# Patient Record
Sex: FEMALE | Race: BLACK OR AFRICAN AMERICAN | NOT HISPANIC OR LATINO | URBAN - METROPOLITAN AREA
[De-identification: names, ages, dates, MRNs, and addresses within clinical notes are randomized per-mention and may not be internally consistent; named-entity substitution may affect disease eponyms.]

---

## 2020-07-25 ENCOUNTER — TELEPHONE ENCOUNTER (OUTPATIENT)
Dept: URBAN - METROPOLITAN AREA CLINIC 13 | Facility: CLINIC | Age: 50
End: 2020-07-25

## 2020-07-25 RX ORDER — ETONOGESTREL AND ETHINYL ESTRADIOL .12; .015 MG/D; MG/D
USE AS DIRECTED INSERT, EXTENDED RELEASE VAGINAL
Refills: 0 | OUTPATIENT
Start: 2015-01-27 | End: 2016-05-03

## 2020-07-25 RX ORDER — OMEPRAZOLE 40 MG/1
TAKE ONE CAPSULE BY MOUTH ONCE DAILY CAPSULE, DELAYED RELEASE ORAL
Qty: 30 | Refills: 3 | OUTPATIENT
Start: 2014-02-21 | End: 2016-05-03

## 2020-07-25 RX ORDER — NORETHINDRONE ACETATE/ETHINYL ESTRADIOL 1.5-0.03MG
TAKE 1 TABLET DAILY KIT ORAL
Qty: 84 | Refills: 0 | OUTPATIENT
Start: 2014-02-14 | End: 2015-02-03

## 2020-07-26 ENCOUNTER — TELEPHONE ENCOUNTER (OUTPATIENT)
Dept: URBAN - METROPOLITAN AREA CLINIC 13 | Facility: CLINIC | Age: 50
End: 2020-07-26

## 2020-07-26 RX ORDER — HYOSCYAMINE SULFATE 0.12 MG/1
PLACE 1 TABLET UNDER THE TONGUE EVERY 4 TO 6 HOURS AS NEEDED TABLET, ORALLY DISINTEGRATING ORAL
Qty: 60 | Refills: 2 | Status: ACTIVE | COMMUNITY
Start: 2016-05-03

## 2020-07-26 RX ORDER — ETONOGESTREL AND ETHINYL ESTRADIOL .12; .015 MG/D; MG/D
INSERT, EXTENDED RELEASE VAGINAL
Qty: 1 | Refills: 0 | Status: ACTIVE | COMMUNITY
Start: 2015-01-26

## 2020-07-26 RX ORDER — METFORMIN HYDROCHLORIDE 500 MG/1
TAKE 3 TABLETS ONCE DAILY WITH THE EVENING MEAL TABLET, COATED ORAL
Refills: 0 | Status: ACTIVE | COMMUNITY
Start: 2014-09-26

## 2020-07-26 RX ORDER — METHOCARBAMOL 500 MG/1
TABLET, FILM COATED ORAL
Qty: 90 | Refills: 0 | Status: ACTIVE | COMMUNITY
Start: 2013-05-21

## 2020-07-26 RX ORDER — CIPROFLOXACIN HYDROCHLORIDE 500 MG/1
TABLET, FILM COATED ORAL
Qty: 20 | Refills: 0 | Status: ACTIVE | COMMUNITY
Start: 2014-11-03

## 2020-10-14 ENCOUNTER — OFFICE VISIT (OUTPATIENT)
Dept: URBAN - METROPOLITAN AREA CLINIC 113 | Facility: CLINIC | Age: 50
End: 2020-10-14

## 2020-10-14 RX ORDER — METFORMIN HYDROCHLORIDE 500 MG/1
TAKE 3 TABLETS ONCE DAILY WITH THE EVENING MEAL TABLET, COATED ORAL
Refills: 0 | Status: DISCONTINUED | COMMUNITY
Start: 2014-09-26

## 2020-10-14 RX ORDER — ETONOGESTREL AND ETHINYL ESTRADIOL .12; .015 MG/D; MG/D
INSERT, EXTENDED RELEASE VAGINAL
Qty: 1 | Refills: 0 | Status: ACTIVE | COMMUNITY
Start: 2015-01-26

## 2020-10-14 RX ORDER — HYOSCYAMINE SULFATE 0.12 MG/1
PLACE 1 TABLET UNDER THE TONGUE EVERY 4 TO 6 HOURS AS NEEDED TABLET, ORALLY DISINTEGRATING ORAL
Qty: 60 | Refills: 2 | Status: DISCONTINUED | COMMUNITY
Start: 2016-05-03

## 2020-10-14 RX ORDER — CIPROFLOXACIN HYDROCHLORIDE 500 MG/1
TABLET, FILM COATED ORAL
Qty: 20 | Refills: 0 | Status: DISCONTINUED | COMMUNITY
Start: 2014-11-03

## 2020-10-14 RX ORDER — METHOCARBAMOL 500 MG/1
TABLET, FILM COATED ORAL
Qty: 90 | Refills: 0 | Status: DISCONTINUED | COMMUNITY
Start: 2013-05-21

## 2020-10-14 NOTE — HPI-TODAY'S VISIT:
This is a 49-year-old female with a history of liver masses, likely hepatic adenomata presenting to discuss ultrasound.  She has not been seen since 2016.  She had an ultrasound 2/8/2019: Liver is echogenic suggesting fatty replacement.  Within the central aspect of the right lobe there is an abnormal hypoechoic lesion measuring 26 mm.  This is stable from prior study suggesting a benign etiology.  No gallstones or wall thickening.  Otherwise unremarkable study.

## 2022-02-22 ENCOUNTER — OFFICE VISIT (OUTPATIENT)
Dept: URBAN - METROPOLITAN AREA CLINIC 113 | Facility: CLINIC | Age: 52
End: 2022-02-22

## 2022-02-22 RX ORDER — ETONOGESTREL AND ETHINYL ESTRADIOL .12; .015 MG/D; MG/D
INSERT, EXTENDED RELEASE VAGINAL
Qty: 1 | Refills: 0 | Status: ACTIVE | COMMUNITY
Start: 2015-01-26

## 2022-02-22 NOTE — HPI-TODAY'S VISIT:
Ms. Lizzy Arredondo is a 49-year-old female with a history of liver masses, likely hepatic adenomata presenting to discuss ultrasound.  She has not been seen since 2016.  She had an ultrasound 2/8/2019: Liver is echogenic suggesting fatty replacement.  Within the central aspect of the right lobe there is an abnormal hypoechoic lesion measuring 26 mm.  This is stable from prior study suggesting a benign etiology.  No gallstones or wall thickening.  Otherwise unremarkable study.   At her last visit in 2016, she  described attacks of epigastric pain described as burning radiating to mid back that will last all day and night, worse with lying down, better with sitting up.  Pain usually lasts 2-3 days at a time and then resolves. She was scheudled for an EGD but never had it performed.    She states she has been diagnosed with fatty liver and diabetes in the last couple years.  No nausea, vomiting, dysphagia, chest pain, weight loss, diarrhea, constipation, hematochezia, melena, fever, chills.  No family history of GI cancers or colon cancer, polyps, IBD.

## 2022-08-18 ENCOUNTER — LAB OUTSIDE AN ENCOUNTER (OUTPATIENT)
Dept: URBAN - METROPOLITAN AREA CLINIC 113 | Facility: CLINIC | Age: 52
End: 2022-08-18

## 2022-08-18 ENCOUNTER — CLAIMS CREATED FROM THE CLAIM WINDOW (OUTPATIENT)
Dept: URBAN - METROPOLITAN AREA CLINIC 113 | Facility: CLINIC | Age: 52
End: 2022-08-18
Payer: COMMERCIAL

## 2022-08-18 VITALS
WEIGHT: 254 LBS | HEART RATE: 81 BPM | SYSTOLIC BLOOD PRESSURE: 149 MMHG | RESPIRATION RATE: 14 BRPM | BODY MASS INDEX: 37.62 KG/M2 | DIASTOLIC BLOOD PRESSURE: 80 MMHG | HEIGHT: 69 IN | TEMPERATURE: 97.1 F

## 2022-08-18 DIAGNOSIS — Z12.11 SCREENING FOR COLON CANCER: ICD-10-CM

## 2022-08-18 DIAGNOSIS — K76.9 LIVER LESION: ICD-10-CM

## 2022-08-18 PROCEDURE — 99203 OFFICE O/P NEW LOW 30 MIN: CPT | Performed by: INTERNAL MEDICINE

## 2022-08-18 RX ORDER — METFORMIN HYDROCHLORIDE 1000 MG/1
1 TABLET WITH A MEAL TABLET, FILM COATED ORAL ONCE A DAY
Status: ACTIVE | COMMUNITY

## 2022-08-18 RX ORDER — ETONOGESTREL AND ETHINYL ESTRADIOL .12; .015 MG/D; MG/D
INSERT, EXTENDED RELEASE VAGINAL
Qty: 1 | Refills: 0 | Status: ACTIVE | COMMUNITY
Start: 2015-01-26

## 2022-08-18 RX ORDER — LOSARTAN POTASSIUM 50 MG/1
1 TABLET TABLET ORAL ONCE A DAY
Status: ACTIVE | COMMUNITY

## 2022-08-18 NOTE — HPI-TODAY'S VISIT:
Ms. Lizzy Arredondo is a 51-year-old female with a history of liver masses, likely hepatic adenomata, presenting for follow up.  She has not been seen in the office since 2016.She has either rescheduled, canceled, or failed to show up for multiple appointments in the interim, including appointments on July 21, 2017, November 16, 2017, 7/31/2018, 1/8/2019, 10/14/2020, and 2/22/2022.  She has never had a colonoscopy, and was referred back on this occasion for consideration of colonoscopic evaluation.  She had an ultrasound 2/8/2019: Liver was echogenic suggesting fatty replacement.  Within the central aspect of the right lobe there is an abnormal hypoechoic lesion measuring 26 mm.  This is stable from prior study suggesting a benign etiology.  No gallstones or wall thickening.  Otherwise unremarkable study. I have no evidence of subsequent imaging.  At her visit in 2016, she  described attacks of epigastric pain described as burning radiating to mid back that will last all day and night, worse with lying down, better with sitting up.  Pain usually lasts 2-3 days at a time and then resolves. She was scheudled for an EGD but never had it performed.    She has been diagnosed with diabetes in recent years.  No nausea, vomiting, dysphagia, chest pain, weight loss, diarrhea, constipation, hematochezia, melena, fever, chills.  No family history of GI cancers or colon cancer, polyps, IBD.

## 2022-08-25 ENCOUNTER — TELEPHONE ENCOUNTER (OUTPATIENT)
Dept: URBAN - METROPOLITAN AREA CLINIC 113 | Facility: CLINIC | Age: 52
End: 2022-08-25

## 2022-08-25 PROBLEM — 235856003 LIVER DISEASE: Status: ACTIVE | Noted: 2022-08-25

## 2022-08-31 ENCOUNTER — CLAIMS CREATED FROM THE CLAIM WINDOW (OUTPATIENT)
Dept: URBAN - METROPOLITAN AREA CLINIC 4 | Facility: CLINIC | Age: 52
End: 2022-08-31
Payer: COMMERCIAL

## 2022-08-31 ENCOUNTER — OFFICE VISIT (OUTPATIENT)
Dept: URBAN - METROPOLITAN AREA SURGERY CENTER 25 | Facility: SURGERY CENTER | Age: 52
End: 2022-08-31
Payer: COMMERCIAL

## 2022-08-31 DIAGNOSIS — K63.5 INFLAMMATORY COLON POLYP: ICD-10-CM

## 2022-08-31 DIAGNOSIS — K51.40 INFLAMMATORY POLYPS OF COLON WITHOUT COMPLICATIONS: ICD-10-CM

## 2022-08-31 DIAGNOSIS — Z12.11 COLON CANCER SCREENING: ICD-10-CM

## 2022-08-31 PROCEDURE — 45385 COLONOSCOPY W/LESION REMOVAL: CPT | Performed by: INTERNAL MEDICINE

## 2022-08-31 PROCEDURE — G8907 PT DOC NO EVENTS ON DISCHARG: HCPCS | Performed by: INTERNAL MEDICINE

## 2022-08-31 PROCEDURE — 88305 TISSUE EXAM BY PATHOLOGIST: CPT | Performed by: PATHOLOGY

## 2023-02-02 ENCOUNTER — OFFICE VISIT (OUTPATIENT)
Dept: URBAN - METROPOLITAN AREA CLINIC 113 | Facility: CLINIC | Age: 53
End: 2023-02-02
Payer: COMMERCIAL

## 2023-02-02 ENCOUNTER — DASHBOARD ENCOUNTERS (OUTPATIENT)
Age: 53
End: 2023-02-02

## 2023-02-02 VITALS
SYSTOLIC BLOOD PRESSURE: 150 MMHG | HEIGHT: 69 IN | TEMPERATURE: 97.7 F | DIASTOLIC BLOOD PRESSURE: 96 MMHG | BODY MASS INDEX: 37.5 KG/M2 | WEIGHT: 253.2 LBS | HEART RATE: 82 BPM | RESPIRATION RATE: 18 BRPM

## 2023-02-02 DIAGNOSIS — K76.9 LIVER LESION: ICD-10-CM

## 2023-02-02 DIAGNOSIS — Z12.11 SCREENING FOR COLON CANCER: ICD-10-CM

## 2023-02-02 PROCEDURE — 99213 OFFICE O/P EST LOW 20 MIN: CPT | Performed by: INTERNAL MEDICINE

## 2023-02-02 RX ORDER — LOSARTAN POTASSIUM 50 MG/1
1 TABLET TABLET ORAL ONCE A DAY
Status: ACTIVE | COMMUNITY

## 2023-02-02 RX ORDER — ETONOGESTREL AND ETHINYL ESTRADIOL .12; .015 MG/D; MG/D
INSERT, EXTENDED RELEASE VAGINAL
Qty: 1 | Refills: 0 | Status: ON HOLD | COMMUNITY
Start: 2015-01-26

## 2023-02-02 RX ORDER — METFORMIN HYDROCHLORIDE 1000 MG/1
1 TABLET WITH A MEAL TABLET, FILM COATED ORAL ONCE A DAY
Status: ACTIVE | COMMUNITY

## 2023-02-02 NOTE — HPI-TODAY'S VISIT:
Ms. Lizzy Arredondo is a 52-year-old female with a history of liver masses, likely hepatic adenomata, presenting for follow up.  She was last seen here on 8/18/22.  Prior to that visit, she had not been seen in the office since 2016, having either rescheduled, canceled, or failed to show up for multiple appointments in the interim, including appointments on 7/21/17, 11/16/2017, 7/31/2018, 1/8/2019, 10/14/2020, and 2/22/2022.  She has never had a colonoscopy, and was referred back on this occasion for consideration of colonoscopic evaluation.  She had an ultrasound 2/8/2019: Liver was echogenic suggesting fatty replacement.  Within the central aspect of the right lobe there is an abnormal hypoechoic lesion measuring 26 mm.  This is stable from prior study suggesting a benign etiology.  No gallstones or wall thickening.  Otherwise unremarkable study. I have no evidence of subsequent imaging.  At her visit in 2016, she  described attacks of epigastric pain described as burning radiating to mid back that will last all day and night, worse with lying down, better with sitting up.  Pain usually lasts 2-3 days at a time and then resolves. She was scheudled for an EGD but never had it performed.    She has been diagnosed with diabetes in recent years.  No nausea, vomiting, dysphagia, chest pain, weight loss, diarrhea, constipation, hematochezia, melena, fever, chills.  No family history of GI cancers or colon cancer, polyps, IBD.  The patient underwent her initial screening colonoscopy on August 31, 2022.  This examination showed left colon diverticulosis, grade 1 internal hemorrhoids, and 2 polypoid lesions in the descending colon measuring between 4 and 6 mm in diameter.  These are inflammatory pseudopolyps.  She had a right upper quadrant ultrasound examination on August 23, 2022 which showed heterogeneity of the hepatic parenchyma, likely representing focal fatty sparing, but masses cannot be ruled out.  MRI done to follow-up on this examination on September 12, 2022 confirmed that the areas of abnormality were simply focal fatty sparing.  No masses were seen.  A six month follow up exam was recommended to insure stability.   She had labs done by her primary care physician on November 8, 2022 revealing a normal CBC, an AST of 17, ALT of 7, alkaline phosphatase of 80, albumin 4.6, total bilirubin of 0.3, and no other abnormalities.  Notably, her hemoglobin A1c was slightly elevated at 6.5.  She has no GI complaints today.

## 2023-02-03 PROBLEM — 300331000: Status: ACTIVE | Noted: 2022-08-18
